# Patient Record
Sex: FEMALE | Race: BLACK OR AFRICAN AMERICAN
[De-identification: names, ages, dates, MRNs, and addresses within clinical notes are randomized per-mention and may not be internally consistent; named-entity substitution may affect disease eponyms.]

---

## 2021-06-04 PROBLEM — Z00.00 ENCOUNTER FOR PREVENTIVE HEALTH EXAMINATION: Status: ACTIVE | Noted: 2021-06-04

## 2021-06-10 ENCOUNTER — APPOINTMENT (OUTPATIENT)
Dept: BREAST CENTER | Facility: CLINIC | Age: 66
End: 2021-06-10
Payer: COMMERCIAL

## 2021-06-10 VITALS
WEIGHT: 245 LBS | HEIGHT: 65 IN | HEART RATE: 89 BPM | BODY MASS INDEX: 40.82 KG/M2 | DIASTOLIC BLOOD PRESSURE: 84 MMHG | SYSTOLIC BLOOD PRESSURE: 142 MMHG

## 2021-06-10 DIAGNOSIS — Z86.79 PERSONAL HISTORY OF OTHER DISEASES OF THE CIRCULATORY SYSTEM: ICD-10-CM

## 2021-06-10 DIAGNOSIS — Z87.19 PERSONAL HISTORY OF OTHER DISEASES OF THE DIGESTIVE SYSTEM: ICD-10-CM

## 2021-06-10 DIAGNOSIS — Z87.09 PERSONAL HISTORY OF OTHER DISEASES OF THE RESPIRATORY SYSTEM: ICD-10-CM

## 2021-06-10 DIAGNOSIS — Z86.39 PERSONAL HISTORY OF OTHER ENDOCRINE, NUTRITIONAL AND METABOLIC DISEASE: ICD-10-CM

## 2021-06-10 DIAGNOSIS — Z80.8 FAMILY HISTORY OF MALIGNANT NEOPLASM OF OTHER ORGANS OR SYSTEMS: ICD-10-CM

## 2021-06-10 DIAGNOSIS — Z87.891 PERSONAL HISTORY OF NICOTINE DEPENDENCE: ICD-10-CM

## 2021-06-10 DIAGNOSIS — Z80.49 FAMILY HISTORY OF MALIGNANT NEOPLASM OF OTHER GENITAL ORGANS: ICD-10-CM

## 2021-06-10 DIAGNOSIS — Z80.1 FAMILY HISTORY OF MALIGNANT NEOPLASM OF TRACHEA, BRONCHUS AND LUNG: ICD-10-CM

## 2021-06-10 PROCEDURE — 99205 OFFICE O/P NEW HI 60 MIN: CPT

## 2021-06-10 PROCEDURE — 99072 ADDL SUPL MATRL&STAF TM PHE: CPT

## 2021-06-10 NOTE — HISTORY OF PRESENT ILLNESS
[FreeTextEntry1] : The patient is a 65-year-old nulliparous postmenopausal female of -American descent.  She underwent menopause in her 50s and never took any hormone replacement therapy.  She underwent menarche at age 10.  She has no family history of breast or ovarian cancer.  Her brother had lung cancer in his 30s and has passed away.  She has a sister had uterine cancer at age 67 who passed away and another sister had thyroid cancer at age 61 who has passed away.  Her father also had cancer and has passed away.  The patient has a history of some type 2 diabetes as well as hypertension and asthma.  She was noted to have a palpable upper inner quadrant right breast mass since September 2020 and eventually was sent for mammography and ultrasound April 13, 2021 showing a highly suspicious 4.4 cm mass in the upper inner aspect of the right breast which on ultrasound corresponded to a 4.7 x 3.1 x 4.3 cm density in the right breast 2:00 region 10 cm from the nipple with multiple abnormal lower axillary lymph nodes with a thickened cortex with the largest node measuring 2.4 x 8.9 cm.  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  She was seen by Dr. Bailon on May 26, 2021 and was advised to have a CT scan as well as MRI and possible PET scan.  She underwent [facility] genetic testing which is reportedly negative.  He spoke to her about neoadjuvant chemotherapy as well.  The patient underwent a CT scan on June 2, 2021 showing the obvious cancer in the right breast and there was a 9 mm nodule seen in the left lower lobe of the lung.  She comes in now for a surgical evaluation.
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
## 2021-06-10 NOTE — ASSESSMENT
[FreeTextEntry1] : The patient is a 65-year-old nulliparous postmenopausal female of -American descent.  She underwent menopause in her 50s and never took any hormone replacement therapy.  She underwent menarche at age 10.  She has no family history of breast or ovarian cancer.  Her brother had lung cancer in his 30s and has passed away.  She has a sister had uterine cancer at age 67 who passed away and another sister had thyroid cancer at age 61 who has passed away.  Her father also had cancer and has passed away.  The patient has a history of some type 2 diabetes as well as hypertension and asthma.  She was noted to have a palpable upper inner quadrant right breast mass since September 2020 and eventually was sent for mammography and ultrasound April 13, 2021 showing a highly suspicious 4.4 cm mass in the upper inner aspect of the right breast which on ultrasound corresponded to a 4.7 x 3.1 x 4.3 cm density in the right breast 2:00 region 10 cm from the nipple with multiple abnormal lower axillary lymph nodes with a thickened cortex with the largest node measuring 2.4 x 8.9 cm.  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  She was seen by Dr. Bailon on May 26, 2021 and was advised to have a CT scan as well as MRI and possible PET scan.  She underwent [facility] genetic testing which is reportedly negative.  He spoke to her about neoadjuvant chemotherapy as well.  The patient underwent a CT scan on June 2, 2021 showing the obvious cancer in the right breast and there was a 9 mm nodule seen in the left lower lobe of the lung.  Unfortunately, the patient did not bring in her diagnostic imaging and I have asked the patient to bring in the films from [facility] so I can review them.  On exam she has this obvious palpable cancer in the upper inner aspect of the right breast with some suspicious right axillary adenopathy.  I reviewed all her reports.  Dr. Bailon has already seen the patient and she already had a CT scan and he is ordering a PET/CT scan.  Genetic testing has already been ordered and is negative.  I spoke to the patient as well as her sister and her close friend today in the office.  For some reason they were against a neoadjuvant approach but I think I have convinced them that this is really the best approach for this cancer.  This would give her the possibility of decreasing her axillary surgery and reducing lymphedema as well as getting some prognostic information regarding this cancer and giving her the possible benefit of oral Xeloda if she does not have a complete response.  All this was discussed with the patient and her friends and family and they have a full understanding.  I will follow up on the PET CT scan report and also I would like to get an MRI and I will review her outside imaging.  I will then have another discussion with the patient and see if we can get her to move forward with a neoadjuvant approach.  They understand the possible need for mastectomy and also have a good understanding of the likely need for radiation therapy even after mastectomy.  All their questions were answered.

## 2021-06-10 NOTE — REASON FOR VISIT
[Initial Evaluation] : an initial evaluation [FreeTextEntry1] : The patient comes in with a newly diagnosed upper inner quadrant right breast triple negative breast cancer.

## 2021-06-16 ENCOUNTER — RESULT REVIEW (OUTPATIENT)
Age: 66
End: 2021-06-16

## 2021-06-23 ENCOUNTER — NON-APPOINTMENT (OUTPATIENT)
Age: 66
End: 2021-06-23

## 2021-06-28 ENCOUNTER — APPOINTMENT (OUTPATIENT)
Dept: BREAST CENTER | Facility: CLINIC | Age: 66
End: 2021-06-28
Payer: COMMERCIAL

## 2021-06-28 PROCEDURE — 99072 ADDL SUPL MATRL&STAF TM PHE: CPT

## 2021-06-28 PROCEDURE — 99213 OFFICE O/P EST LOW 20 MIN: CPT
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
## 2021-06-28 NOTE — HISTORY OF PRESENT ILLNESS
[FreeTextEntry1] : The patient is a 65-year-old nulliparous postmenopausal female of -American descent.  She underwent menopause in her 50s and never took any hormone replacement therapy.  She underwent menarche at age 10.  She has no family history of breast or ovarian cancer.  Her brother had lung cancer in his 30s and has passed away.  She has a sister had uterine cancer at age 67 who passed away and another sister had thyroid cancer at age 61 who has passed away.  Her father also had cancer and has passed away.  The patient has a history of some type 2 diabetes as well as hypertension and asthma.  She was noted to have a palpable upper inner quadrant right breast mass since September 2020 and eventually was sent for mammography and ultrasound April 13, 2021 showing a highly suspicious 4.4 cm mass in the upper inner aspect of the right breast which on ultrasound corresponded to a 4.7 x 3.1 x 4.3 cm density in the right breast 2:00 region 10 cm from the nipple with multiple abnormal lower axillary lymph nodes with a thickened cortex with the largest node measuring 2.4 x 8.9 cm.  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  She was seen by Dr. Bailon on May 26, 2021 and was advised to have a CT scan as well as MRI and possible PET scan.  She underwent [facility] genetic testing which is reportedly negative.  He spoke to her about neoadjuvant chemotherapy as well.  The patient underwent a CT scan on June 2, 2021 showing the obvious cancer in the right breast and there was a 9 mm nodule seen in the left lower lobe of the lung.  This lung density was felt to be chronic and unchanged.  I am still awaiting the patient undergo the PET scan but she comes in now for another surgical evaluation and discussion.

## 2021-06-28 NOTE — ASSESSMENT
[FreeTextEntry1] : The patient is a 65-year-old nulliparous postmenopausal female of -American descent.  She underwent menopause in her 50s and never took any hormone replacement therapy.  She underwent menarche at age 10.  She has no family history of breast or ovarian cancer.  Her brother had lung cancer in his 30s and has passed away.  She has a sister had uterine cancer at age 67 who passed away and another sister had thyroid cancer at age 61 who has passed away.  Her father also had cancer and has passed away.  The patient has a history of some type 2 diabetes as well as hypertension and asthma.  She was noted to have a palpable upper inner quadrant right breast mass since September 2020 and eventually was sent for mammography and ultrasound April 13, 2021 showing a highly suspicious 4.4 cm mass in the upper inner aspect of the right breast which on ultrasound corresponded to a 4.7 x 3.1 x 4.3 cm density in the right breast 2:00 region 10 cm from the nipple with multiple abnormal lower axillary lymph nodes with a thickened cortex with the largest node measuring 2.4 x 8.9 cm.  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  She was seen by Dr. Bailon on May 26, 2021 and was advised to have a CT scan as well as MRI and possible PET scan.  She underwent [facility] genetic testing which is reportedly negative.  He spoke to her about neoadjuvant chemotherapy as well.  The patient underwent a CT scan on June 2, 2021 showing the obvious cancer in the right breast and there was a 9 mm nodule seen in the left lower lobe of the lung which according to Dr. Bailon is chronic and has been unchanged.  On exam she has this obvious palpable cancer in the upper inner aspect of the right breast with some suspicious right axillary adenopathy.  I reviewed all her reports.  Dr. Bailon has already seen the patient and she has a PET/CT scan ordered.  Genetic testing has already been ordered and is negative.  I spoke to the patient as well as her sister and her close friend in the office previously and for some reason they were against a neoadjuvant approach which would give her the possibility of decreasing her axillary surgery and reducing lymphedema as well as getting some prognostic information regarding this cancer and giving her the possible benefit of oral Xeloda if she does not have a complete response.  All this was discussed with the patient and her friends and family on her last visit and they had a full understanding.  The patient was unable to tolerate an MRI.  I am awaiting the results of the PET scan which she has ordered at [facility].  They understand the possible need for mastectomy and also have a good understanding of the likely need for radiation therapy even after mastectomy.  I spoke to Dr. Bailon and the patient is absolutely refusing neoadjuvant chemotherapy though this would be the appropriate management for this patient.  She was initially refusing the PET scan but I insisted before I would perform any mastectomy on her.  She understands that she will likely need a modified radical mastectomy since I feel her nodes are probably involved and I will look at the PET/CT scan results to see if a sentinel lymph node biopsy is even necessary.  I would like her to see Dr. Ndiaye for a plastic surgery evaluation since she does want reconstruction at the time of the mastectomy.  She would have to have a likely expander reconstruction due to the likely need of postmastectomy radiation and she does want a reduction in her breast size. She understands the benefits for chemotherapy but is unwilling to move forward with a neoadjuvant approach despite the advantages and benefits which I explained to her.  She and her friend understand that this will be a staged definitive reconstruction.  She is the friend of . Desire Glynn who is one of my patients.  I will follow up on the PET scan result and speak to Dr. Ndiaye after his consultation and I can then look at scheduling surgery.

## 2021-06-28 NOTE — REASON FOR VISIT
[Follow-Up: _____] : a [unfilled] follow-up visit [FreeTextEntry1] : The patient comes in with a history of a palpable right upper inner quadrant triple negative breast cancer diagnosed as a palpable mass with ultrasound showing a 4.7 cm mass and biopsy showing a poorly differentiated triple negative invasive duct cancer.  She comes in for a second surgical discussion regarding management of this newly diagnosed cancer.

## 2021-07-07 ENCOUNTER — NON-APPOINTMENT (OUTPATIENT)
Age: 66
End: 2021-07-07

## 2021-07-21 ENCOUNTER — APPOINTMENT (OUTPATIENT)
Dept: BREAST CENTER | Facility: CLINIC | Age: 66
End: 2021-07-21
Payer: COMMERCIAL

## 2021-07-21 DIAGNOSIS — R92.8 OTHER ABNORMAL AND INCONCLUSIVE FINDINGS ON DIAGNOSTIC IMAGING OF BREAST: ICD-10-CM

## 2021-07-21 DIAGNOSIS — Z17.1 MALIGNANT NEOPLASM OF UPPER-INNER QUADRANT OF RIGHT FEMALE BREAST: ICD-10-CM

## 2021-07-21 DIAGNOSIS — C50.211 MALIGNANT NEOPLASM OF UPPER-INNER QUADRANT OF RIGHT FEMALE BREAST: ICD-10-CM

## 2021-07-21 PROCEDURE — 99072 ADDL SUPL MATRL&STAF TM PHE: CPT

## 2021-07-21 PROCEDURE — 99212 OFFICE O/P EST SF 10 MIN: CPT

## 2021-07-21 NOTE — HISTORY OF PRESENT ILLNESS
[FreeTextEntry1] : The patient is a 65-year-old nulliparous postmenopausal female of -American descent.  She underwent menopause in her 50s and never took any hormone replacement therapy.  She underwent menarche at age 10.  She has no family history of breast or ovarian cancer.  Her brother had lung cancer in his 30s and has passed away.  She has a sister had uterine cancer at age 67 who passed away and another sister had thyroid cancer at age 61 who has passed away.  Her father also had cancer and has passed away.  The patient has a history of some type 2 diabetes as well as hypertension and asthma.  She was noted to have a palpable upper inner quadrant right breast mass since September 2020 and eventually was sent for mammography and ultrasound April 13, 2021 showing a highly suspicious 4.4 cm mass in the upper inner aspect of the right breast which on ultrasound corresponded to a 4.7 x 3.1 x 4.3 cm density in the right breast 2:00 region 10 cm from the nipple with multiple abnormal lower axillary lymph nodes with a thickened cortex with the largest node measuring 2.4 x 8.9 cm.  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  She was seen by Dr. Bailon on May 26, 2021 and was advised to have a CT scan as well as MRI and possible PET scan.  She underwent [facility] genetic testing which is reportedly negative.  He spoke to her about neoadjuvant chemotherapy as well.  The patient underwent a CT scan on June 2, 2021 showing the obvious cancer in the right breast and there was a 9 mm nodule seen in the left lower lobe of the lung.  This lung density was felt to be chronic and unchanged.  The patient was unable to tolerate an MRI.  She underwent a PET/CT scan on July 2, 2021 at [facility] showing the known right breast cancer measuring at least 6.2 cm with multiple suspicious nodes in the right axilla.  The left lung nodule is chronic and did not show any uptake.  She did have some bilateral large pelvic lymph nodes with some increased uptake in an obturator as well as right external iliac and right inguinal lymph node which were suspicious.  I spoke to Dr. Bailon in this patient has a clinical prognostic stage IIIB breast cancer if she was considered T2 N1 M0.  She really is a candidate for neoadjuvant chemotherapy but is refusing and wants upfront surgery.  I had her come in for discussion and reexamination of her right inguinal region but do have her tentatively scheduled for modified radical mastectomy for next week.
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
## 2021-07-21 NOTE — REASON FOR VISIT
[Follow-Up: _____] : a [unfilled] follow-up visit [FreeTextEntry1] : The patient comes in with a history of a palpable right upper inner quadrant triple negative breast cancer diagnosed as a palpable mass with ultrasound showing a 4.7 cm mass and biopsy showing a poorly differentiated triple negative invasive duct cancer.  She also had suspicious nodes on ultrasound but biopsy was benign.  PET scan showed the significant cancer in the right breast as well as multiple suspicious lower axillary lymph nodes with some questionable uptake in the right pelvic lymph nodes making this a clinical prognostic stage IIIB breast cancer.  She is a candidate for neoadjuvant chemotherapy but has refused.  She comes in for presurgical discussion.

## 2021-07-21 NOTE — ASSESSMENT
[FreeTextEntry1] : The patient is a 65-year-old nulliparous postmenopausal female of -[place] descent.  She underwent menopause in her 50s and never took any hormone replacement therapy.  She underwent menarche at age 10.  She has no family history of breast or ovarian cancer.  Her brother had lung cancer in his 30s and has passed away.  She has a sister had uterine cancer at age 67 who passed away and another sister had thyroid cancer at age 61 who has passed away.  Her father also had cancer and has passed away.  The patient has a history of some type 2 diabetes as well as hypertension and asthma.  She was noted to have a palpable upper inner quadrant right breast mass since September 2020 and eventually was sent for mammography and ultrasound April 13, 2021 showing a highly suspicious 4.4 cm mass in the upper inner aspect of the right breast which on ultrasound corresponded to a 4.7 x 3.1 x 4.3 cm density in the right breast 2:00 region 10 cm from the nipple with multiple abnormal lower axillary lymph nodes with a thickened cortex with the largest node measuring 2.4 x 8.9 cm.  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  She was seen by Dr. Bailon on May 26, 2021 and was advised to have a CT scan as well as MRI and possible PET scan.  She underwent [facility] genetic testing which is reportedly negative.  He spoke to her about neoadjuvant chemotherapy as well.  The patient underwent a CT scan on June 2, 2021 showing the obvious cancer in the right breast and there was a 9 mm nodule seen in the left lower lobe of the lung which according to Dr. Bailon is chronic and has been unchanged.  On exam she has this obvious palpable cancer in the upper inner aspect of the right breast with some suspicious right axillary adenopathy.  I reviewed all her reports.  The patient was unable to tolerate an MRI Dr. Bailon saw the patient and she has a PET/CT scan ordered.  Genetic testing has already been ordered and is negative.  I spoke to the patient as well as her sister and her close friend in the office previously and for some reason they were against a neoadjuvant approach which would give her the possibility of decreasing her axillary surgery and reducing lymphedema as well as getting some prognostic information regarding this cancer and giving her the possible benefit of oral Xeloda if she does not have a complete response.  All this was discussed with the patient and her friends and family on her last visit and they had a full understanding.  The patient was unable to tolerate an MRI.  She understands the benefits for chemotherapy but is unwilling to move forward with a neoadjuvant approach despite the advantages and benefits which I explained to her.  She and her friend understand that this will be a staged definitive reconstruction.  She is the friend of Mrs. Desire Glynn who is one of my patients.  She underwent a PET/CT scan on July 2, 2021 at [facility] showing the known right breast cancer measuring at least 6.2 cm with multiple suspicious nodes in the right axilla.  The left lung nodule is chronic and did not show any uptake.  She did have some bilateral large pelvic lymph nodes with some increased uptake in an obturator as well as right external iliac and right inguinal lymph node which were suspicious.  I spoke to Dr. Bailon in this patient has a clinical prognostic stage IIIB breast cancer if she was considered T2 N1 M0.  She really is a candidate for neoadjuvant chemotherapy but is refusing and wants upfront surgery.  I had her come in for discussion and reexamination of her right inguinal region but do have her tentatively scheduled for modified radical mastectomy for next week.  I had her to see Dr. Ndiaye for a plastic surgery evaluation and she would have to have a likely expander reconstruction due to the likely need of postmastectomy radiation and she does want a reduction in her breast size.  On exam today, I cannot feel any suspicious inguinal adenopathy just typical shotty inguinal nodes with a lot of chronic skin changes and scarring from some chronic skin irritation and rashes.  I spoke to Dr. Bailon regarding the PET scan results and he feels that this breast cancer definitely takes precedence and that we should go forward with surgical treatment since she is refusing neoadjuvant chemotherapy.  He agrees that neoadjuvant chemotherapy would have been a better choice but the patient is adamantly refusing upfront chemotherapy.  I did print out a letter for her to sign stating that we have advised her as to the health/survival benefits for neoadjuvant chemotherapy but she is refusing.  I did tell her the need for adjuvant postoperative chemotherapy and she is currently agreeing to having a port placed at the time of surgery so I can try to add this  to the consent.  She has taken the letter and stated that she will sign this prior to the surgery for me.  Her partner was asking about removing the left breast as well but I am adamantly against this given the significant right breast cancer and the added surgery this prophylactic contralateral mastectomy would entail.  She understands the upcoming surgery which would be a modified radical mastectomy with an axillary lymph node dissection of the right breast with expander reconstruction.  All risks/complications of the procedure were described to the patient and the patient and her partner's questions were answered.

## 2021-07-22 ENCOUNTER — NON-APPOINTMENT (OUTPATIENT)
Age: 66
End: 2021-07-22

## 2021-07-28 ENCOUNTER — APPOINTMENT (OUTPATIENT)
Dept: BREAST CENTER | Facility: HOSPITAL | Age: 66
End: 2021-07-28

## 2021-08-09 ENCOUNTER — NON-APPOINTMENT (OUTPATIENT)
Age: 66
End: 2021-08-09